# Patient Record
Sex: MALE | Race: WHITE | NOT HISPANIC OR LATINO | Employment: FULL TIME | URBAN - NONMETROPOLITAN AREA
[De-identification: names, ages, dates, MRNs, and addresses within clinical notes are randomized per-mention and may not be internally consistent; named-entity substitution may affect disease eponyms.]

---

## 2017-06-22 ENCOUNTER — OFFICE VISIT - GICH (OUTPATIENT)
Dept: FAMILY MEDICINE | Facility: OTHER | Age: 42
End: 2017-06-22

## 2017-06-22 ENCOUNTER — HISTORY (OUTPATIENT)
Dept: FAMILY MEDICINE | Facility: OTHER | Age: 42
End: 2017-06-22

## 2017-06-22 DIAGNOSIS — E04.2 NONTOXIC MULTINODULAR GOITER: ICD-10-CM

## 2017-06-22 DIAGNOSIS — E03.8 OTHER SPECIFIED HYPOTHYROIDISM: ICD-10-CM

## 2017-06-22 LAB — TSH - HISTORICAL: 1.59 UIU/ML (ref 0.34–5.6)

## 2017-07-10 ENCOUNTER — COMMUNICATION - GICH (OUTPATIENT)
Dept: FAMILY MEDICINE | Facility: OTHER | Age: 42
End: 2017-07-10

## 2017-12-28 NOTE — PROGRESS NOTES
"Patient Information     Patient Name MRN Sex Emil Palomino [id number] Male 1975      Progress Notes by Andrew Ross MD at 2017  2:00 PM     Author:  Andrew Ross MD Service:  (none) Author Type:  Physician     Filed:  2017  8:18 AM Encounter Date:  2017 Status:  Signed     :  Andrew Ross MD (Physician)            SUBJECTIVE:    Emil Logan is a 42 y.o. male who presents for med follow up     HPI    Has been on synthroid for over 5 years.  Doing well. No stool changes.  No palpitations.  Had a goiter which started it, medications resolved it.  Tried stopping the medications and it returned.     Allergies     Allergen  Reactions     Fish Containing Products Angioedema   ,   No current outpatient prescriptions on file prior to visit.     No current facility-administered medications on file prior to visit.    ,   Past Medical History:     Diagnosis  Date     Hypothyroidism     and   Past Surgical History:      Procedure  Laterality Date     NO PREVIOUS SURGERY         REVIEW OF SYSTEMS:  Review of Systems   Constitutional: Negative for weight loss.   Cardiovascular: Negative for palpitations.   Gastrointestinal: Negative for constipation and diarrhea.       OBJECTIVE:  /64  Resp 14  Ht 1.759 m (5' 9.25\")  Wt 92.3 kg (203 lb 6.4 oz)  BMI 29.82 kg/m2    EXAM:   Physical Exam   Constitutional: He is well-developed, well-nourished, and in no distress.   Neck: No thyromegaly present.   Cardiovascular: Normal rate and regular rhythm.  Exam reveals no gallop and no friction rub.    No murmur heard.      ASSESSMENT/PLAN:    ICD-10-CM    1. Other specified hypothyroidism E03.8 levothyroxine (SYNTHROID) 100 mcg tablet      TSH   2. Multinodular goiter (nontoxic) E04.2         Plan:  Goiter has been successfully shrunk with the synthroid. Follow up yearly.    Andrew Ross MD ....................  2017   2:08 PM    Results for orders placed or performed in " visit on 06/22/17      TSH      Result  Value Ref Range    TSH 1.59 0.34 - 5.60 uIU/mL

## 2017-12-28 NOTE — TELEPHONE ENCOUNTER
Patient Information     Patient Name MRN Sex Emil Palomino [id number] Male 1975      Telephone Encounter by Antione Cloud LPN at 2017  8:26 AM     Author:  Antione Cloud LPN Service:  (none) Author Type:  NURS- Licensed Practical Nurse     Filed:  2017  8:26 AM Encounter Date:  7/10/2017 Status:  Signed     :  Antione Cloud LPN (NURS- Licensed Practical Nurse)            Please advise.  Antione Cloud LPN ..............2017 8:26 AM'

## 2017-12-28 NOTE — TELEPHONE ENCOUNTER
Patient Information     Patient Name MRN Sex Emil Palomino [id number] Male 1975      Telephone Encounter by Andrew Ross MD at 2017  8:47 AM     Author:  Andrew Ross MD Service:  (none) Author Type:  Physician     Filed:  2017  8:47 AM Encounter Date:  7/10/2017 Status:  Signed     :  Andrew Ross MD (Physician)            This needs to go to a the MD who did the visit.  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  Andrew Ross MD ....................  2017   8:47 AM

## 2017-12-28 NOTE — TELEPHONE ENCOUNTER
"Patient Information     Patient Name MRN Sex Emil Palomino [id number] Male 1975      Telephone Encounter by Liliana Ruiz at 2017 12:27 PM     Author:  Liliana Ruiz Service:  (none) Author Type:  (none)     Filed:  2017 12:39 PM Encounter Date:  7/10/2017 Status:  Signed     :  Liliana Ruiz from [facility] called back regarding patient's DOT form. She states that on page 1; the \"drivers ID verified by\" line was left blank and she needs it filled out. On page 3; the line where it says \"the applicant can read signs that are red, green and herlinda colored\" was checked NO but the monocular vision was checked YES, yet the patient was given a medical card. She is confused and is wondering if those lines were checked wrong as her employer stated that anyone who is color blind cannot be given a medical card and cannot work for their company. She would like a call back regarding this and the form to be filled out correctly if there were any mistakes.   Liliana HUYNH CMA.......2017..12:37 PM          "

## 2017-12-30 NOTE — NURSING NOTE
Patient Information     Patient Name MRN Sex Emil Palomino [id number] Male 1975      Nursing Note by Esthela Flores at 2017  2:00 PM     Author:  Esthela Flores Service:  (none) Author Type:  (none)     Filed:  2017  1:59 PM Encounter Date:  2017 Status:  Signed     :  Esthela Flores            Coming in to have his medication refilled.Esthela Flores ....................  2017   1:53 PM

## 2018-01-27 VITALS
DIASTOLIC BLOOD PRESSURE: 64 MMHG | SYSTOLIC BLOOD PRESSURE: 124 MMHG | RESPIRATION RATE: 14 BRPM | WEIGHT: 203.4 LBS | HEIGHT: 69 IN | BODY MASS INDEX: 30.13 KG/M2

## 2018-02-16 ENCOUNTER — DOCUMENTATION ONLY (OUTPATIENT)
Dept: FAMILY MEDICINE | Facility: OTHER | Age: 43
End: 2018-02-16

## 2018-02-16 PROBLEM — E04.2 MULTINODULAR GOITER (NONTOXIC): Status: ACTIVE | Noted: 2017-06-22

## 2018-02-16 RX ORDER — LEVOTHYROXINE SODIUM 100 UG/1
100 TABLET ORAL DAILY
COMMUNITY
Start: 2017-06-22 | End: 2018-08-06

## 2018-03-07 ENCOUNTER — OFFICE VISIT (OUTPATIENT)
Dept: UROLOGY | Facility: OTHER | Age: 43
End: 2018-03-07
Attending: UROLOGY
Payer: COMMERCIAL

## 2018-03-07 VITALS
BODY MASS INDEX: 30.27 KG/M2 | WEIGHT: 204.4 LBS | SYSTOLIC BLOOD PRESSURE: 118 MMHG | HEIGHT: 69 IN | RESPIRATION RATE: 12 BRPM | HEART RATE: 64 BPM | DIASTOLIC BLOOD PRESSURE: 82 MMHG

## 2018-03-07 DIAGNOSIS — Z30.09 ENCOUNTER FOR VASECTOMY COUNSELING: Primary | ICD-10-CM

## 2018-03-07 PROCEDURE — 99203 OFFICE O/P NEW LOW 30 MIN: CPT | Performed by: UROLOGY

## 2018-03-07 RX ORDER — HYDROCODONE BITARTRATE AND ACETAMINOPHEN 5; 325 MG/1; MG/1
1-2 TABLET ORAL EVERY 4 HOURS PRN
Qty: 12 TABLET | Refills: 0 | Status: SHIPPED | OUTPATIENT
Start: 2018-03-07 | End: 2019-01-25

## 2018-03-07 ASSESSMENT — PAIN SCALES - GENERAL: PAINLEVEL: NO PAIN (0)

## 2018-03-07 NOTE — PROGRESS NOTES
I was asked to see this patient by Dr Ross and provide my opinion about the following:  Elective sterilization    Type of Visit  Consult    Chief Complaint  Elective sterilization    HPI  Mr. Logan is a 42 year old male who presents with desire for irreversible, elective sterilization.    He has 3 kids.    His significant other is supportive of this decision.    He has been considering this decision for 5 years.  He denies erectile dysfunction.  He denies a history of bleeding disorders or reactions to local anesthetic.      Past Medical History  He  has a past medical history of Hypothyroidism.  Patient Active Problem List   Diagnosis     Hypothyroidism     Multinodular goiter (nontoxic)       Past Surgical History  He  has a past surgical history that includes other surgical history.    Medications  He has a current medication list which includes the following prescription(s): hydrocodone-acetaminophen and levothyroxine.    Allergies  Allergies   Allergen Reactions     Fish-Derived Products Other (See Comments)     Angioedema, fresh water fish       Social History  He  reports that he quit smoking about 6 years ago. His smoking use included Cigarettes. He smoked 0.25 packs per day. His smokeless tobacco use includes Chew. He reports that he drinks alcohol.  No drug abuse.    Family History  No  cancers    Review of Systems  I personally reviewed the ROS with the patient.    Nursing Notes:   Yanna Crowley RN  3/7/2018  8:38 AM  Signed  Review of Systems:    Weight loss:    No     Recent fever/chills:  No   Night sweats:   No  Current skin rash:  No   Recent hair loss:  No  Heat intolerance:  No   Cold intolerance:  No  Chest pain:   No   Palpitations:   No  Shortness of breath:  No   Wheezing:   No  Constipation:    No   Diarrhea:   No   Nausea:   No   Vomiting:   No   Kidney/side pain:  No   Back pain:   No  Frequent headaches:  No   Dizziness:     No  Leg swelling:   No   Calf pain:    No    Parents,  "brothers or sisters with history of kidney cancer:   No  Parents, brothers or sisters with history of bladder cancer: No    Physical Exam  Vitals:    03/07/18 0834   BP: 118/82   BP Location: Right arm   Patient Position: Sitting   Cuff Size: Adult Regular   Pulse: 64   Resp: 12   Weight: 92.7 kg (204 lb 6.4 oz)   Height: 1.753 m (5' 9\")     Constitutional: NAD, WDWN.   Head: NCAT  Eyes: Conjunctivae normal  Cardiovascular: Regular rate.  Pulmonary/Chest: Respirations are even and non-labored bilaterally.  Abdominal: Soft. No distension, tenderness, masses or guarding. No CVA tenderness.  Neurological: A + O x 3.  Cranial Nerves II-XII grossly intact.  Extremities: LOULOU x 4, Warm. No clubbing.  No cyanosis.    Skin: Pink, warm and dry.  No rashes noted.  Psychiatric:  Normal mood and affect  Genitourinary:  Phallus normal without lesions, testicles descended bilaterally, no masses.    Bilateral vasa palpable    Assessment  Mr. Logan is a 42 year old male who presents today requesting permanent, irreversible surgical sterilization.  The vasectomy procedure was discussed in detail with the patient today including the following:  - Preparation prior to the procedure  - Expectations the day of the procedure  - Risks of the procedure such as sterilization failure, infection, bleeding and/or development of chronic testicular pain.    - Expectations and limitations during recovery    Furthermore, the patient was told he would remain fertile following the procedure until he provided a semen analysis that met the definition of infertile (no sperm present or <100,000 nonmotile sperm/mL).  This is usually planned for 3 months after the procedure.  The patient expressed understanding and desire to proceed.    Plan  Rx for Norco prior to and after the procedure  Provided vasectomy hand out again outlining the above risks and benefits as well as need for follow up semen analysis    "

## 2018-03-07 NOTE — MR AVS SNAPSHOT
"              After Visit Summary   3/7/2018    Emil Logan    MRN: [id number]           Patient Information     Date Of Birth          1975        Visit Information        Provider Department      3/7/2018 8:30 AM Paulo Lou MD [facility]        Today's Diagnoses     Encounter for vasectomy counseling    -  1       Follow-ups after your visit        Who to contact     If you have questions or need follow up information about today's clinic visit or your schedule please contact [facility] directly at [phone].  Normal or non-critical lab and imaging results will be communicated to you by E-nterviewhart, letter or phone within 4 business days after the clinic has received the results. If you do not hear from us within 7 days, please contact the clinic through WeoGeot or phone. If you have a critical or abnormal lab result, we will notify you by phone as soon as possible.  Submit refill requests through Wedia or call your pharmacy and they will forward the refill request to us. Please allow 3 business days for your refill to be completed.          Additional Information About Your Visit        MyChart Information     Wedia lets you send messages to your doctor, view your test results, renew your prescriptions, schedule appointments and more. To sign up, go to [email or web address] . Click on \"Log in\" on the left side of the screen, which will take you to the Welcome page. Then click on \"Sign up Now\" on the right side of the page.     You will be asked to enter the access code listed below, as well as some personal information. Please follow the directions to create your username and password.     Your access code is: [email or web address]  Expires: 2018  8:56 AM     Your access code will  in 90 days. If you need help or a new code, please call your [facility] or [phone].        Care EveryWhere ID     This is your Care EveryWhere ID. This could be " "used by other organizations to access your [place] medical records  [id number]        Your Vitals Were     Pulse Respirations Height BMI (Body Mass Index)          64 12 1.753 m (5' 9\") 30.18 kg/m2         Blood Pressure from Last 3 Encounters:   03/07/18 118/82   06/22/17 124/64   05/06/16 128/70    Weight from Last 3 Encounters:   03/07/18 92.7 kg (204 lb 6.4 oz)   06/22/17 92.3 kg (203 lb 6.4 oz)   05/06/16 92 kg (202 lb 12.8 oz)              Today, you had the following     No orders found for display         Today's Medication Changes          These changes are accurate as of 3/7/18  8:57 AM.  If you have any questions, ask your nurse or doctor.               Start taking these medicines.        Dose/Directions    HYDROcodone-acetaminophen 5-325 MG per tablet   Commonly known as:  NORCO   Used for:  Encounter for vasectomy counseling   Started by:  Paulo Lou MD        Dose:  1-2 tablet   Take 1-2 tablets by mouth every 4 hours as needed Take 1-2 tablets by mouth every 4-6 hours as needed for pain   Quantity:  12 tablet   Refills:  0            Where to get your medicines      Some of these will need a paper prescription and others can be bought over the counter.  Ask your nurse if you have questions.     Bring a paper prescription for each of these medications     HYDROcodone-acetaminophen 5-325 MG per tablet                Primary Care Provider Office Phone # Fax #    Andrew Ross [phone]       [address] [facility] [address]        Equal Access to Services     [facility]: Hadii delores echavarriao Sodanny, waaxda luqadaha, qaybta kaalmada adeegkai, waxay idibrandi khan. So [facility] [phone].    ATENCIÓN: Si habla español, tiene a shearer disposición servicios gratuitos de asistencia lingüística. Llame al [phone].    We comply with applicable federal civil rights laws and [place] laws. We do not discriminate on the basis of race, color, national " origin, age, disability, sex, sexual orientation, or gender identity.            Thank you!     Thank you for choosing [facility] AND [facility]  for your care. Our goal is always to provide you with excellent care. Hearing back from our patients is one way we can continue to improve our services. Please take a few minutes to complete the written survey that you may receive in the mail after your visit with us. Thank you!             Your Updated Medication List - Protect others around you: Learn how to safely use, store and throw away your medicines at www.disposemymeds.org.          This list is accurate as of 3/7/18  8:57 AM.  Always use your most recent med list.                   Brand Name Dispense Instructions for use Diagnosis    HYDROcodone-acetaminophen 5-325 MG per tablet    NORCO    12 tablet    Take 1-2 tablets by mouth every 4 hours as needed Take 1-2 tablets by mouth every 4-6 hours as needed for pain    Encounter for vasectomy counseling       levothyroxine 100 MCG tablet    SYNTHROID/LEVOTHROID     Take 100 mcg by mouth daily

## 2018-03-07 NOTE — NURSING NOTE
Review of Systems:    Weight loss:    No     Recent fever/chills:  No   Night sweats:   No  Current skin rash:  No   Recent hair loss:  No  Heat intolerance:  No   Cold intolerance:  No  Chest pain:   No   Palpitations:   No  Shortness of breath:  No   Wheezing:   No  Constipation:    No   Diarrhea:   No   Nausea:   No   Vomiting:   No   Kidney/side pain:  No   Back pain:   No  Frequent headaches:  No   Dizziness:     No  Leg swelling:   No   Calf pain:    No    Parents, brothers or sisters with history of kidney cancer:   No  Parents, brothers or sisters with history of bladder cancer: No

## 2018-05-30 ENCOUNTER — OFFICE VISIT (OUTPATIENT)
Dept: UROLOGY | Facility: OTHER | Age: 43
End: 2018-05-30
Attending: UROLOGY
Payer: COMMERCIAL

## 2018-05-30 VITALS — HEART RATE: 62 BPM | BODY MASS INDEX: 30.27 KG/M2 | RESPIRATION RATE: 14 BRPM | WEIGHT: 205 LBS

## 2018-05-30 DIAGNOSIS — Z30.2 ENCOUNTER FOR VASECTOMY: Primary | ICD-10-CM

## 2018-05-30 PROCEDURE — 88302 TISSUE EXAM BY PATHOLOGIST: CPT

## 2018-05-30 PROCEDURE — 55250 REMOVAL OF SPERM DUCT(S): CPT | Performed by: UROLOGY

## 2018-05-30 ASSESSMENT — PAIN SCALES - GENERAL: PAINLEVEL: NO PAIN (0)

## 2018-05-30 NOTE — NURSING NOTE
Vasectomy  Per verbal order read back by Paulo Lou MD to prep patient for vasectomy.  Patient positioned in supine position, perineum area prepped with chlorhexidene Gluconate and patient draped per sterile technique.    Lidocaine 2%  Lot #: [id number]  Expiration date: 01/22  : SELENA [facility]: [id number]    [place] Protocol    A. Pre-procedure verification complete Yes  1-relevant information / documentation available, reviewed and properly matched to the patient; 2-consent accurate and complete, 3-equipment and supplies available    B. Site marking complete N/A  Site marked if not in continuous attendance with patient    C. TIME OUT completed Yes  Time Out was conducted just prior to starting procedure to verify the eight required elements: 1-patient identity, 2-consent accurate and complete, 3-position, 4-correct side/site marked (if applicable), 5-procedure, 6-relevant images / results properly labeled and displayed (if applicable), 7-antibiotics / irrigation fluids (if applicable), 8-safety precautions.    After procedure perineum area rinsed. Semen analysis container given to patient. Patient reminded to have a semen analysis performed 3 months after the procedure to confirm sterility and to ejaculate about 1-2 dozen times following the vasectomy and prior to semen collection. Discharge instructions reviewed with patient. Patient verbalized understanding of discharge instructions and discharged ambulatory.

## 2018-05-30 NOTE — PATIENT INSTRUCTIONS
Home Care after Vasectomy   Follow these guidelines for your care after your surgery to help your recovery.     Activity   Limit your activity for the first 5 days after the procedure to light activity.   You may return to work typically in 2 days.   Limit lifting, pushing or pulling to less than 20 pounds until you are no longer sore.   Limit running and long walks until you are no longer sore.   Limit sexual activity for 5 days.     Swelling   Scrotal swelling from the surgery may take weeks to get better. You should call your doctor if the swelling is severe and the scrotum is larger than an orange.  Apply a bag of frozen peas to the scrotum for 15 minutes every 1-2 hours for the first 48 hours when you are awake to limit swelling. It works best to not apply the bag of frozen peas directly to the skin.  Wear a jock strap to support your scrotum and reduce swelling.  Continue wearing the jock strap until you are no longer sore, 1-2 weeks.     Incision care   The single stitch placed in the scrotum will dissolve and does not need to be removed.  A small amount of blood may stain the dressings for up to 2-3 days after the procedure.  For the first few days, apply two or three gauze pads to the site each day and as needed to keep the dressing dry. This will protect the incision and help keep your clothes clean.  When you are no longer having any drainage, stop using the gauze pads over the site.     Bathing or showering   You may shower after the procedure but do not scrub the stitch area.  Allow the water to wash over the stitch and do not scrub the area. Dry the site gently by patting it with a clean towel.  Tub baths should be avoided for 7 days after surgery.  Swimming should be avoided for 14 days after surgery.        Pain control   You were provided with a pain medication prescription during the clinic consultation, please use this as needed.  Also, please take ibuprofen as we discussed in clinic.  Pain most  often is eased after 5 to 7 days.     Sexual activity   Please avoid ejaculating for 5 days after procedure or until soreness is resolved.     Follow up   Following this procedure you are still considered fertile and would be able to impregnate your partner.  You should have a semen analysis performed 3 months or greater after your procedure to confirm sterility.  Ideally you would ejaculate about 2-3 dozen times following the vasectomy and prior to semen collection.  Use birth control until the semen analysis is confirmed sterile.     Use contraceptives until this is confirmed to prevent pregnancy.     Contacts   General Questions: [phone]   Appointments: [phone]   Emergencies: [phone]     When to call your doctor   Call the urology office if you have any of the following:   Severe swelling, larger than the size of an orange   A large amount of fluid drainage that soaks several pads per day   Pain that is not controlled with pain medicine, jock strap and use of frozen peas   Any signs of infection such as the following:   -Redness or tenderness around the incision site worsening after 2-3 days or   -Pus type drainage from the incision or   -Fever of greater than 101 degrees F     Also call the office if you have any questions or concerns about your care.

## 2018-05-30 NOTE — MR AVS SNAPSHOT
After Visit Summary   5/30/2018    Emil Logan    MRN: [id number]           Patient Information     Date Of Birth          1975        Visit Information        Provider Department      5/30/2018 9:15 AM Paulo Lou MD [facility]        Today's Diagnoses     Encounter for vasectomy    -  1      Care Instructions    Home Care after Vasectomy   Follow these guidelines for your care after your surgery to help your recovery.     Activity   Limit your activity for the first 5 days after the procedure to light activity.   You may return to work typically in 2 days.   Limit lifting, pushing or pulling to less than 20 pounds until you are no longer sore.   Limit running and long walks until you are no longer sore.   Limit sexual activity for 5 days.     Swelling   Scrotal swelling from the surgery may take weeks to get better. You should call your doctor if the swelling is severe and the scrotum is larger than an orange.  Apply a bag of frozen peas to the scrotum for 15 minutes every 1-2 hours for the first 48 hours when you are awake to limit swelling. It works best to not apply the bag of frozen peas directly to the skin.  Wear a jock strap to support your scrotum and reduce swelling.  Continue wearing the jock strap until you are no longer sore, 1-2 weeks.     Incision care   The single stitch placed in the scrotum will dissolve and does not need to be removed.  A small amount of blood may stain the dressings for up to 2-3 days after the procedure.  For the first few days, apply two or three gauze pads to the site each day and as needed to keep the dressing dry. This will protect the incision and help keep your clothes clean.  When you are no longer having any drainage, stop using the gauze pads over the site.     Bathing or showering   You may shower after the procedure but do not scrub the stitch area.  Allow the water to wash over the stitch and do not scrub the area. Dry  the site gently by patting it with a clean towel.  Tub baths should be avoided for 7 days after surgery.  Swimming should be avoided for 14 days after surgery.        Pain control   You were provided with a pain medication prescription during the clinic consultation, please use this as needed.  Also, please take ibuprofen as we discussed in clinic.  Pain most often is eased after 5 to 7 days.     Sexual activity   Please avoid ejaculating for 5 days after procedure or until soreness is resolved.     Follow up   Following this procedure you are still considered fertile and would be able to impregnate your partner.  You should have a semen analysis performed 3 months or greater after your procedure to confirm sterility.  Ideally you would ejaculate about 2-3 dozen times following the vasectomy and prior to semen collection.  Use birth control until the semen analysis is confirmed sterile.     Use contraceptives until this is confirmed to prevent pregnancy.     Contacts   General Questions: [phone]   Appointments: [phone]   Emergencies: [phone]     When to call your doctor   Call the urology office if you have any of the following:   Severe swelling, larger than the size of an orange   A large amount of fluid drainage that soaks several pads per day   Pain that is not controlled with pain medicine, jock strap and use of frozen peas   Any signs of infection such as the following:   -Redness or tenderness around the incision site worsening after 2-3 days or   -Pus type drainage from the incision or   -Fever of greater than 101 degrees F     Also call the office if you have any questions or concerns about your care.           Follow-ups after your visit        Future tests that were ordered for you today     Open Future Orders        Priority Expected Expires Ordered    Post Vasectomy Count Motility Routine  5/29/2019 5/30/2018            Who to contact     If you have questions or need follow up information  "about today's clinic visit or your schedule please contact [facility] directly at [phone].  Normal or non-critical lab and imaging results will be communicated to you by GBShart, letter or phone within 4 business days after the clinic has received the results. If you do not hear from us within 7 days, please contact the clinic through GBShart or phone. If you have a critical or abnormal lab result, we will notify you by phone as soon as possible.  Submit refill requests through Torbit or call your pharmacy and they will forward the refill request to us. Please allow 3 business days for your refill to be completed.          Additional Information About Your Visit        GBShart Information     Torbit lets you send messages to your doctor, view your test results, renew your prescriptions, schedule appointments and more. To sign up, go to [email or web address] . Click on \"Log in\" on the left side of the screen, which will take you to the Welcome page. Then click on \"Sign up Now\" on the right side of the page.     You will be asked to enter the access code listed below, as well as some personal information. Please follow the directions to create your username and password.     Your access code is: [email or web address]  Expires: 2018  9:56 AM     Your access code will  in 90 days. If you need help or a new code, please call your [place] clinic or [phone].        Care EveryWhere ID     This is your Care EveryWhere ID. This could be used by other organizations to access your [facility] medical records  [id number]        Your Vitals Were     Pulse Respirations BMI (Body Mass Index)             62 14 30.27 kg/m2          Blood Pressure from Last 3 Encounters:   18 118/82   17 124/64   16 128/70    Weight from Last 3 Encounters:   18 93 kg (205 lb)   18 92.7 kg (204 lb 6.4 oz)   17 92.3 kg (203 lb 6.4 oz)              We Performed the Following     " Surgical pathology exam     VASECTOMY UNILAT/BILAT W POSTOP SEMEN        Primary Care Provider Office Phone # Fax #    Andrew Ross [phone] [address] [facility] [address]        Equal Access to Services     COLEENADEOLA ALEX : Hadii delores grayson hadtyroneo Soomaali, waaxda luqadaha, qaybta kaalmada adeegyada, waxbrittney bradly dorisn cyndie fatouparul khan. So [facility] [phone].    ATENCIÓN: Si habla español, tiene a shearer disposición servicios gratuitos de asistencia lingüística. Llame al [phone].    We comply with applicable federal civil rights laws and [place] laws. We do not discriminate on the basis of race, color, national origin, age, disability, sex, sexual orientation, or gender identity.            Thank you!     Thank you for choosing [facility]  for your care. Our goal is always to provide you with excellent care. Hearing back from our patients is one way we can continue to improve our services. Please take a few minutes to complete the written survey that you may receive in the mail after your visit with us. Thank you!             Your Updated Medication List - Protect others around you: Learn how to safely use, store and throw away your medicines at www.disposemymeds.org.          This list is accurate as of 5/30/18  9:22 AM.  Always use your most recent med list.                   Brand Name Dispense Instructions for use Diagnosis    HYDROcodone-acetaminophen 5-325 MG per tablet    NORCO    12 tablet    Take 1-2 tablets by mouth every 4 hours as needed Take 1-2 tablets by mouth every 4-6 hours as needed for pain    Encounter for vasectomy counseling       levothyroxine 100 MCG tablet    SYNTHROID/LEVOTHROID     Take 100 mcg by mouth daily

## 2018-05-30 NOTE — PROGRESS NOTES
Preoperative diagnosis  Elective sterilization    Postoperative diagnosis  Elective sterilization    Procedure performed  Bilateral vasectomy    Surgeon  Paulo Lou MD    Anesthesia  8 mL lidocaine 2% local injection    Complications  None    EBL  <1 mL    Specimen  Left vas  Right vas    Indications  43 year old male agreed to undergo the above named procedure after discussion of the alternatives, risks and benefits.  Informed consent was obtained.      Procedure  The patient was brought to the procedure room and placed in supine position.  His scrotum was prepped with Hibiclens and he was draped in a sterile fashion.  A timeout was performed.    Lidocaine 2% was used to anesthetize the scrotal skin as well as perivasal sheath initially on the patient's left.  A 1 cm skin incision was created with the sharp-tip mosquito and a vas clamp utilized through this incision to grasp the vas.  The left vas was elevated to the skin surface and dissected free of its perivasal sheath.  The vas was transected and the lumen was cauterized both proximally and distally.  A 4-0 chromic suture was utilized to place the proximal vasal portion under the perivasal sheath, such that the 2 ends were divided by the perivasal sheath (fascial interposition).  This portion of the vas was then allowed to drop back into the left hemiscrotum.  The right side was treated next in the same manner as described above.  The skin incision was closed with the 4-0 chromic suture.  The patient tolerated the procedure well.    It was again reiterated to the patient that he would remain fertile for sometime and should present to the office for a post-vacectomy semen analysis to confirm sterility in 3 months.  The patient again expressed understanding.

## 2018-07-23 NOTE — PROGRESS NOTES
Patient Information     Patient Name  Emil oLgan MRN  [id number] Sex  Male   1975      Letter by Andrew Ross MD at      Author:  Andrew Ross MD Service:  (none) Author Type:  (none)    Filed:   Encounter Date:  2017 Status:  (Other)           Emil Logan  [address]  [place] [address]          2017    Dear Mr. Logan:    Following are the tests completed during your last clinic visit.  The results of these tests are normal and require no further attention unless otherwise noted.    Results for orders placed or performed in visit on 17      TSH      Result  Value Ref Range    TSH 1.59 0.34 - 5.60 uIU/mL         If you have any further questions or problems contact my office at  [phone].    Thank you,    Andrew Ross MD

## 2018-08-06 DIAGNOSIS — E03.8 OTHER SPECIFIED HYPOTHYROIDISM: Primary | ICD-10-CM

## 2018-08-06 NOTE — LETTER
August 9, 2018      Emil Logan  [address] [address]        Dear Emil,       This is to remind you that you are due for your annual appointment with Andrew Ross as well as annual labs. Your last visit was on 6/22/17. Additional refills of your medication require you to complete this visit.    Please call [phone] to schedule your appointment.    Thank you for choosing [facility] and [facility] for your health care needs.    Sincerely,      Refill RN  [facility]

## 2018-08-09 RX ORDER — LEVOTHYROXINE SODIUM 100 UG/1
TABLET ORAL
Qty: 90 TABLET | Refills: 0 | Status: SHIPPED | OUTPATIENT
Start: 2018-08-09 | End: 2019-01-08

## 2018-08-09 NOTE — TELEPHONE ENCOUNTER
Last TSH was on 6/22/17 with result of 1.59. LOV with PCP was also on that date. No appointment is scheduled at this time. No changes noted to Rx as requested in office visit notes on 6/22/17. However, patient was to follow up on a yearly basis and has failed to do so. Writer will refill Rx as requested for a limited supply at this time as well as will send patient a reminder letter.    Prescription refilled per RN Medication Refill Policy..................Joseph Ratliff 8/9/2018 11:27 AM

## 2019-01-08 ENCOUNTER — TELEPHONE (OUTPATIENT)
Dept: FAMILY MEDICINE | Facility: OTHER | Age: 44
End: 2019-01-08

## 2019-01-08 DIAGNOSIS — E03.8 OTHER SPECIFIED HYPOTHYROIDISM: ICD-10-CM

## 2019-01-08 RX ORDER — LEVOTHYROXINE SODIUM 100 UG/1
100 TABLET ORAL DAILY
Qty: 30 TABLET | Refills: 0 | Status: SHIPPED | OUTPATIENT
Start: 2019-01-08 | End: 2019-01-25

## 2019-01-08 NOTE — TELEPHONE ENCOUNTER
Refill request from patient for  Levothyroxine (Synthroid/Levthroid) 100 mcg tablet    LOV with PCP 6/22/2017    Pt given manuel refill 8/6/2018 and letter sent.    Verified medication and pharmacy with pt    Contacted pt and assisted in scheduling appt for 1/25/2019 with PCP    Will fill for 30 days    Fern Ramirez RN  ....................  1/8/2019   2:17 PM

## 2019-01-25 ENCOUNTER — OFFICE VISIT (OUTPATIENT)
Dept: FAMILY MEDICINE | Facility: OTHER | Age: 44
End: 2019-01-25
Attending: FAMILY MEDICINE
Payer: COMMERCIAL

## 2019-01-25 VITALS
RESPIRATION RATE: 16 BRPM | WEIGHT: 206 LBS | BODY MASS INDEX: 29.49 KG/M2 | SYSTOLIC BLOOD PRESSURE: 120 MMHG | HEIGHT: 70 IN | DIASTOLIC BLOOD PRESSURE: 64 MMHG | HEART RATE: 64 BPM

## 2019-01-25 DIAGNOSIS — E03.8 OTHER SPECIFIED HYPOTHYROIDISM: ICD-10-CM

## 2019-01-25 DIAGNOSIS — Z00.00 ROUTINE GENERAL MEDICAL EXAMINATION AT A HEALTH CARE FACILITY: Primary | ICD-10-CM

## 2019-01-25 LAB
ANION GAP SERPL CALCULATED.3IONS-SCNC: 7 MMOL/L (ref 3–14)
BUN SERPL-MCNC: 17 MG/DL (ref 7–25)
CALCIUM SERPL-MCNC: 9.5 MG/DL (ref 8.6–10.3)
CHLORIDE SERPL-SCNC: 105 MMOL/L (ref 98–107)
CHOLEST SERPL-MCNC: 201 MG/DL
CO2 SERPL-SCNC: 28 MMOL/L (ref 21–31)
CREAT SERPL-MCNC: 0.98 MG/DL (ref 0.7–1.3)
GFR SERPL CREATININE-BSD FRML MDRD: 83 ML/MIN/{1.73_M2}
GLUCOSE SERPL-MCNC: 84 MG/DL (ref 70–105)
HDLC SERPL-MCNC: 83 MG/DL (ref 23–92)
LDLC SERPL CALC-MCNC: 103 MG/DL
NONHDLC SERPL-MCNC: 118 MG/DL
POTASSIUM SERPL-SCNC: 4.4 MMOL/L (ref 3.5–5.1)
SODIUM SERPL-SCNC: 140 MMOL/L (ref 134–144)
TRIGL SERPL-MCNC: 77 MG/DL
TSH SERPL DL<=0.05 MIU/L-ACNC: 0.38 IU/ML (ref 0.34–5.6)

## 2019-01-25 PROCEDURE — 80048 BASIC METABOLIC PNL TOTAL CA: CPT | Performed by: FAMILY MEDICINE

## 2019-01-25 PROCEDURE — 36415 COLL VENOUS BLD VENIPUNCTURE: CPT | Performed by: FAMILY MEDICINE

## 2019-01-25 PROCEDURE — 99396 PREV VISIT EST AGE 40-64: CPT | Performed by: FAMILY MEDICINE

## 2019-01-25 PROCEDURE — 84443 ASSAY THYROID STIM HORMONE: CPT | Performed by: FAMILY MEDICINE

## 2019-01-25 PROCEDURE — 80061 LIPID PANEL: CPT | Performed by: FAMILY MEDICINE

## 2019-01-25 RX ORDER — LEVOTHYROXINE SODIUM 100 UG/1
100 TABLET ORAL DAILY
Qty: 90 TABLET | Refills: 3 | Status: SHIPPED | OUTPATIENT
Start: 2019-01-25 | End: 2020-02-19

## 2019-01-25 ASSESSMENT — MIFFLIN-ST. JEOR: SCORE: 1817.72

## 2019-01-25 ASSESSMENT — ANXIETY QUESTIONNAIRES
GAD7 TOTAL SCORE: 0
6. BECOMING EASILY ANNOYED OR IRRITABLE: NOT AT ALL
3. WORRYING TOO MUCH ABOUT DIFFERENT THINGS: NOT AT ALL
5. BEING SO RESTLESS THAT IT IS HARD TO SIT STILL: NOT AT ALL
2. NOT BEING ABLE TO STOP OR CONTROL WORRYING: NOT AT ALL
7. FEELING AFRAID AS IF SOMETHING AWFUL MIGHT HAPPEN: NOT AT ALL
1. FEELING NERVOUS, ANXIOUS, OR ON EDGE: NOT AT ALL
IF YOU CHECKED OFF ANY PROBLEMS ON THIS QUESTIONNAIRE, HOW DIFFICULT HAVE THESE PROBLEMS MADE IT FOR YOU TO DO YOUR WORK, TAKE CARE OF THINGS AT HOME, OR GET ALONG WITH OTHER PEOPLE: NOT DIFFICULT AT ALL

## 2019-01-25 ASSESSMENT — PATIENT HEALTH QUESTIONNAIRE - PHQ9: 5. POOR APPETITE OR OVEREATING: NOT AT ALL

## 2019-01-25 ASSESSMENT — PAIN SCALES - GENERAL: PAINLEVEL: NO PAIN (0)

## 2019-01-25 NOTE — PROGRESS NOTES
SUBJECTIVE:   CC: Emil Logan is an 43 year old male who presents for preventive health visit.     Healthy Habits:    Do you get at least three servings of calcium containing foods daily (dairy, green leafy vegetables, etc.)? yes    Amount of exercise or daily activities, outside of work: 1-2 day(s) per week    Problems taking medications regularly No    Medication side effects: No    Have you had an eye exam in the past two years? yes    Do you see a dentist twice per year? no    Do you have sleep apnea, excessive snoring or daytime drowsiness?no      PROBLEMS TO ADD ON...needs a refill on his thyroid meds.    Today's PHQ-2 Score:   PHQ-2 (  [employer]) 2019   Q1: Little interest or pleasure in doing things 0   Q2: Feeling down, depressed or hopeless 0   PHQ-2 Score 0       Abuse: Current or Past(Physical, Sexual or Emotional)- No  Do you feel safe in your environment? Yes    Social History     Tobacco Use     Smoking status: Former Smoker     Packs/day: 0.25     Types: Cigarettes     Last attempt to quit:      Years since quittin.0     Smokeless tobacco: Former User     Types: Chew   Substance Use Topics     Alcohol use: Yes     Alcohol/week: 0.0 oz     Comment: weekly     If you drink alcohol do you typically have >3 drinks per day or >7 drinks per week? No                      Last PSA: No results found for: PSA    Reviewed orders with patient. Reviewed health maintenance and updated orders accordingly - Yes  Current Outpatient Medications   Medication Sig Dispense Refill     levothyroxine (SYNTHROID/LEVOTHROID) 100 MCG tablet Take 1 tablet (100 mcg) by mouth daily 90 tablet 3     Allergies   Allergen Reactions     Fish-Derived Products Other (See Comments)     Angioedema, fresh water fish       Reviewed and updated as needed this visit by clinical staff  Tobacco  Allergies  Meds  Med Hx  Surg Hx  Fam Hx  Soc Hx        Reviewed and updated as needed this visit by Provider        Past  "Medical History:   Diagnosis Date     Hypothyroidism     No Comments Provided      Past Surgical History:   Procedure Laterality Date     OTHER SURGICAL HISTORY      CSL196,NO PREVIOUS SURGERY       ROS:  CONSTITUTIONAL: NEGATIVE for fever, chills, change in weight  INTEGUMENTARY/SKIN: NEGATIVE for worrisome rashes, moles or lesions  EYES: NEGATIVE for vision changes or irritation  ENT: NEGATIVE for ear, mouth and throat problems  RESP: NEGATIVE for significant cough or SOB  CV: NEGATIVE for chest pain, palpitations or peripheral edema  GI: NEGATIVE for nausea, abdominal pain, heartburn, or change in bowel habits   male: negative for dysuria, hematuria, decreased urinary stream, erectile dysfunction, urethral discharge  MUSCULOSKELETAL: NEGATIVE for significant arthralgias or myalgia  NEURO: NEGATIVE for weakness, dizziness or paresthesias  PSYCHIATRIC: NEGATIVE for changes in mood or affect    OBJECTIVE:   /64 (BP Location: Right arm, Patient Position: Sitting, Cuff Size: Adult Large)   Pulse 64   Resp 16   Ht 1.765 m (5' 9.5\")   Wt 93.4 kg (206 lb)   BMI 29.98 kg/m    EXAM:  GENERAL: healthy, alert and no distress  EYES: Eyes grossly normal to inspection, PERRL and conjunctivae and sclerae normal  HENT: ear canals and TM's normal, nose and mouth without ulcers or lesions  NECK: no adenopathy, no asymmetry, masses, or scars and thyroid normal to palpation  RESP: lungs clear to auscultation - no rales, rhonchi or wheezes  CV: regular rate and rhythm, normal S1 S2, no S3 or S4, no murmur, click or rub, no peripheral edema and peripheral pulses strong  ABDOMEN: soft, nontender, no hepatosplenomegaly, no masses and bowel sounds normal  MS: no gross musculoskeletal defects noted, no edema  SKIN: no suspicious lesions or rashes  NEURO: Normal strength and tone, mentation intact and speech normal  PSYCH: mentation appears normal, affect normal/bright    Diagnostic Test Results:  Results for orders placed or " "performed in visit on 01/25/19   Basic Metabolic Panel   Result Value Ref Range    Sodium 140 134 - 144 mmol/L    Potassium 4.4 3.5 - 5.1 mmol/L    Chloride 105 98 - 107 mmol/L    Carbon Dioxide 28 21 - 31 mmol/L    Anion Gap 7 3 - 14 mmol/L    Glucose 84 70 - 105 mg/dL    Urea Nitrogen 17 7 - 25 mg/dL    Creatinine 0.98 0.70 - 1.30 mg/dL    GFR Estimate 83 >60 mL/min/[1.73_m2]    GFR Estimate If Black >90 >60 mL/min/[1.73_m2]    Calcium 9.5 8.6 - 10.3 mg/dL   TSH   Result Value Ref Range    Thyrotropin 0.38 0.34 - 5.60 IU/mL   Lipid Panel   Result Value Ref Range    Cholesterol 201 (H) <200 mg/dL    Triglycerides 77 <150 mg/dL    HDL Cholesterol 83 23 - 92 mg/dL    LDL Cholesterol Calculated 103 (H) <100 mg/dL    Non HDL Cholesterol 118 <130 mg/dL       ASSESSMENT/PLAN:   (Z00.00) Routine general medical examination at a health care facility  (primary encounter diagnosis)  Comment: healthy  Plan:      (E03.8) Other specified hypothyroidism  Comment: stable  Plan: levothyroxine (SYNTHROID/LEVOTHROID) 100 MCG         tablet, Lipid Panel, TSH, Basic Metabolic Panel        Refilled meds      COUNSELING:  Reviewed preventive health counseling, as reflected in patient instructions       Regular exercise       Healthy diet/nutrition    BP Readings from Last 1 Encounters:   01/25/19 120/64     Estimated body mass index is 29.98 kg/m  as calculated from the following:    Height as of this encounter: 1.765 m (5' 9.5\").    Weight as of this encounter: 93.4 kg (206 lb).      Weight management plan: Discussed healthy diet and exercise guidelines     reports that he quit smoking about 7 years ago. His smoking use included cigarettes. He smoked 0.25 packs per day. He has quit using smokeless tobacco. His smokeless tobacco use included chew.  Tobacco Cessation Action Plan: Information offered: Patient not interested at this time    Counseling Resources:  ATP IV Guidelines  Pooled Cohorts Equation Calculator  FRAX Risk " Assessment  ICSI Preventive Guidelines  Dietary Guidelines for Americans, 2010  USDA's MyPlate  ASA Prophylaxis  Lung CA Screening    Andrew Ross MD  [facility]

## 2019-01-25 NOTE — LETTER
January 28, 2019      Emil Logan  [address] [address]   [place] [address]        Dear Emil,     This all looks great.  Your 10 year risk of a heart attack is under 1%.  This is low due to the very high protective HDL reading, which makes the total look elevated.    Results for orders placed or performed in visit on 01/25/19   Basic Metabolic Panel   Result Value Ref Range    Sodium 140 134 - 144 mmol/L    Potassium 4.4 3.5 - 5.1 mmol/L    Chloride 105 98 - 107 mmol/L    Carbon Dioxide 28 21 - 31 mmol/L    Anion Gap 7 3 - 14 mmol/L    Glucose 84 70 - 105 mg/dL    Urea Nitrogen 17 7 - 25 mg/dL    Creatinine 0.98 0.70 - 1.30 mg/dL    GFR Estimate 83 >60 mL/min/[1.73_m2]    GFR Estimate If Black >90 >60 mL/min/[1.73_m2]    Calcium 9.5 8.6 - 10.3 mg/dL   TSH   Result Value Ref Range    Thyrotropin 0.38 0.34 - 5.60 IU/mL   Lipid Panel   Result Value Ref Range    Cholesterol 201 (H) <200 mg/dL    Triglycerides 77 <150 mg/dL    HDL Cholesterol 83 23 - 92 mg/dL    LDL Cholesterol Calculated 103 (H) <100 mg/dL    Non HDL Cholesterol 118 <130 mg/dL             Sincerely,        Andrew Ross MD

## 2019-01-25 NOTE — NURSING NOTE
"Coming in for a medication up date    Chief Complaint   Patient presents with     Recheck Medication     needs refill        Initial /64 (BP Location: Right arm, Patient Position: Sitting, Cuff Size: Adult Large)   Pulse 64   Resp 16   Ht 1.765 m (5' 9.5\")   Wt 93.4 kg (206 lb)   BMI 29.98 kg/m   Estimated body mass index is 29.98 kg/m  as calculated from the following:    Height as of this encounter: 1.765 m (5' 9.5\").    Weight as of this encounter: 93.4 kg (206 lb).  Medication Reconciliation: complete    Esthela Flores LPN  "

## 2019-01-26 ASSESSMENT — ANXIETY QUESTIONNAIRES: GAD7 TOTAL SCORE: 0

## 2019-06-10 ENCOUNTER — APPOINTMENT (OUTPATIENT)
Dept: FAMILY MEDICINE | Facility: OTHER | Age: 44
End: 2019-06-10
Attending: CHIROPRACTOR

## 2019-06-10 PROCEDURE — 40001068 ZZHCL STATISTIC UA W/ REFLEX TO MICRO GICH: Mod: ZL | Performed by: CHIROPRACTOR

## 2019-06-10 PROCEDURE — 99499 UNLISTED E&M SERVICE: CPT | Performed by: CHIROPRACTOR

## 2019-11-08 DIAGNOSIS — E03.8 OTHER SPECIFIED HYPOTHYROIDISM: ICD-10-CM

## 2019-11-11 RX ORDER — LEVOTHYROXINE SODIUM 100 UG/1
TABLET ORAL
Qty: 90 TABLET | Refills: 0 | Status: SHIPPED | OUTPATIENT
Start: 2019-11-11 | End: 2021-01-19

## 2019-11-11 NOTE — TELEPHONE ENCOUNTER
"Requested Prescriptions   Pending Prescriptions Disp Refills     levothyroxine (SYNTHROID/LEVOTHROID) 100 MCG tablet [Pharmacy Med Name: LEVOTHYROXINE 0.100MG (100MCG) TAB] 90 tablet 0     Sig: TAKE 1 TABLET BY MOUTH EVERY DAY       Thyroid Protocol Passed - 11/8/2019  4:01 AM        Passed - Patient is 12 years or older        Passed - Recent (12 mo) or future (30 days) visit within the authorizing provider's specialty     Patient has had an office visit with the authorizing provider or a provider within the authorizing providers department within the previous 12 mos or has a future within next 30 days. See \"Patient Info\" tab in inbasket, or \"Choose Columns\" in Meds & Orders section of the refill encounter.              Passed - Medication is active on med list        Passed - Normal TSH on file in past 12 months     No lab results found.           lov 01/25/2019  Prescription approved per [facility] Refill Protocol.      "

## 2020-02-19 DIAGNOSIS — E03.8 OTHER SPECIFIED HYPOTHYROIDISM: ICD-10-CM

## 2020-02-19 NOTE — TELEPHONE ENCOUNTER
LEVOTHYROXINE 100 MCG PO tablet          Sig: TAKE 1 TABLET(100 MCG) BY MOUTH DAILY           Last Written Prescription Date:  11/11/19  Last Fill Quantity: 90,   # refills: 0  Last Office Visit: 1/25/19  Future Office visit:       Routing refill request to provider for review/approval because:  Patient due for annual exam and lab. Has been greater then 1 year since either has been done. Unable to refill per protocol. To Andrew Ross for consideration. Isabelle Jurado RN on 2/19/2020 at 2:56 PM

## 2020-02-20 RX ORDER — LEVOTHYROXINE SODIUM 100 UG/1
TABLET ORAL
Qty: 90 TABLET | Refills: 0 | Status: SHIPPED | OUTPATIENT
Start: 2020-02-20 | End: 2020-07-27

## 2020-07-24 DIAGNOSIS — E03.8 OTHER SPECIFIED HYPOTHYROIDISM: ICD-10-CM

## 2020-07-27 RX ORDER — LEVOTHYROXINE SODIUM 100 UG/1
TABLET ORAL
Qty: 90 TABLET | Refills: 0 | Status: SHIPPED | OUTPATIENT
Start: 2020-07-27 | End: 2020-11-18

## 2020-07-27 NOTE — TELEPHONE ENCOUNTER
"Requested Prescriptions   Pending Prescriptions Disp Refills     levothyroxine (SYNTHROID/LEVOTHROID) 100 MCG tablet [Pharmacy Med Name: LEVOTHYROXINE 0.100MG (100MCG) TAB] 90 tablet 0     Sig: TAKE 1 TABLET(100 MCG) BY MOUTH DAILY       Thyroid Protocol Failed - 7/24/2020  3:58 AM        Failed - Recent (12 mo) or future (30 days) visit within the authorizing provider's specialty     Patient has had an office visit with the authorizing provider or a provider within the authorizing providers department within the previous 12 mos or has a future within next 30 days. See \"Patient Info\" tab in inbasket, or \"Choose Columns\" in Meds & Orders section of the refill encounter.              Failed - Normal TSH on file in past 12 months     No lab results found.           Passed - Patient is 12 years or older        Passed - Medication is active on med list               Last Written Prescription Date:  02/20/2020  Last Fill Quantity: 90,   # refills: 0  Last Office Visit: 01/25/2019 with Andrew Ross MD  Future Office visit:   None noted.  Unable to complete prescription refill per RN medication refill policy. Will route to provider for review and consideration.   Sonia Canseco RN on 7/27/2020 at 10:41 AM                "

## 2020-11-16 DIAGNOSIS — E03.8 OTHER SPECIFIED HYPOTHYROIDISM: ICD-10-CM

## 2020-11-17 NOTE — TELEPHONE ENCOUNTER
Bjorn MARIE sent Rx request for the following:   levothyroxine (SYNTHROID/LEVOTHROID) 100 MCG tablet  Sig: TAKE 1 TABLET(100 MCG) BY MOUTH DAILY   Last Prescription Date:   7/27/2020  Last Fill Qty/Refills:         90, R-0    Last Office Visit:              1/25/2019   Future Office visit:           None noted        Called patient regarding need for annual review.Left message.   Second attempt made to contact patient. No answer. Will route to provider at this time.   Bibi Marquez RN ....................  11/17/2020   1:14 PM

## 2020-11-19 RX ORDER — LEVOTHYROXINE SODIUM 100 UG/1
TABLET ORAL
Qty: 30 TABLET | Refills: 0 | Status: SHIPPED | OUTPATIENT
Start: 2020-11-19 | End: 2021-01-19

## 2020-11-19 NOTE — TELEPHONE ENCOUNTER
Dr. Ross is out until 11/23/2020. Routing to  [facility]. Bibi Marquez RN ....................  11/19/2020   10:14 AM

## 2020-12-08 ENCOUNTER — TELEPHONE (OUTPATIENT)
Dept: FAMILY MEDICINE | Facility: OTHER | Age: 45
End: 2020-12-08

## 2020-12-19 DIAGNOSIS — E03.8 OTHER SPECIFIED HYPOTHYROIDISM: ICD-10-CM

## 2020-12-21 RX ORDER — LEVOTHYROXINE SODIUM 100 UG/1
TABLET ORAL
Qty: 30 TABLET | Refills: 0 | OUTPATIENT
Start: 2020-12-21

## 2020-12-21 NOTE — TELEPHONE ENCOUNTER
[facility] sent Rx request for the following:      Requested Prescriptions   Pending Prescriptions Disp Refills   levothyroxine (SYNTHROID/LEVOTHROID) 100 MCG tablet [Pharmacy Med Name: LEVOTHYROXINE 0.100MG (100MCG) TAB] 30 tablet 0    Sig: TAKE 1 TABLET(100 MCG) BY MOUTH DAILY   Last Prescription Date:   11/19/20  Last Fill Qty/Refills:         30, R-0    Last Office Visit:              1/25/19  Future Office visit:           None     Thyroid Protocol Failed - 12/21/2020 10:46 AM       Failed - Recent (12 mo) or future (30 days) visit within the authorizing provider's specialty       Failed - Normal TSH on file in past 12 months     Per telephone encounter, dated 12/8/20, Pt was informed that he would need to be seen before his next refill. No upcoming appointments noted. Refill request refused, with note to pharmacy. Unable to complete prescription refill per RN Medication Refill Policy. Jazlyn Padilla RN .............. 12/21/2020  10:48 AM

## 2021-01-19 ENCOUNTER — OFFICE VISIT (OUTPATIENT)
Dept: FAMILY MEDICINE | Facility: OTHER | Age: 46
End: 2021-01-19
Attending: FAMILY MEDICINE
Payer: COMMERCIAL

## 2021-01-19 VITALS
SYSTOLIC BLOOD PRESSURE: 128 MMHG | HEART RATE: 69 BPM | DIASTOLIC BLOOD PRESSURE: 70 MMHG | WEIGHT: 205.8 LBS | BODY MASS INDEX: 29.96 KG/M2 | TEMPERATURE: 95.5 F | RESPIRATION RATE: 14 BRPM | OXYGEN SATURATION: 95 %

## 2021-01-19 DIAGNOSIS — E03.9 HYPOTHYROIDISM, UNSPECIFIED TYPE: Primary | ICD-10-CM

## 2021-01-19 DIAGNOSIS — E04.2 MULTINODULAR GOITER (NONTOXIC): ICD-10-CM

## 2021-01-19 DIAGNOSIS — E03.8 OTHER SPECIFIED HYPOTHYROIDISM: ICD-10-CM

## 2021-01-19 PROCEDURE — 99213 OFFICE O/P EST LOW 20 MIN: CPT | Performed by: FAMILY MEDICINE

## 2021-01-19 RX ORDER — LEVOTHYROXINE SODIUM 100 UG/1
100 TABLET ORAL DAILY
Qty: 90 TABLET | Refills: 4 | Status: SHIPPED | OUTPATIENT
Start: 2021-01-19 | End: 2021-04-15

## 2021-01-19 ASSESSMENT — ANXIETY QUESTIONNAIRES
GAD7 TOTAL SCORE: 0
3. WORRYING TOO MUCH ABOUT DIFFERENT THINGS: NOT AT ALL
5. BEING SO RESTLESS THAT IT IS HARD TO SIT STILL: NOT AT ALL
IF YOU CHECKED OFF ANY PROBLEMS ON THIS QUESTIONNAIRE, HOW DIFFICULT HAVE THESE PROBLEMS MADE IT FOR YOU TO DO YOUR WORK, TAKE CARE OF THINGS AT HOME, OR GET ALONG WITH OTHER PEOPLE: NOT DIFFICULT AT ALL
6. BECOMING EASILY ANNOYED OR IRRITABLE: NOT AT ALL
2. NOT BEING ABLE TO STOP OR CONTROL WORRYING: NOT AT ALL
1. FEELING NERVOUS, ANXIOUS, OR ON EDGE: NOT AT ALL
7. FEELING AFRAID AS IF SOMETHING AWFUL MIGHT HAPPEN: NOT AT ALL

## 2021-01-19 ASSESSMENT — PATIENT HEALTH QUESTIONNAIRE - PHQ9: 5. POOR APPETITE OR OVEREATING: NOT AT ALL

## 2021-01-19 ASSESSMENT — PAIN SCALES - GENERAL: PAINLEVEL: NO PAIN (0)

## 2021-01-19 NOTE — NURSING NOTE
"Coming in for a medication check up    Chief Complaint   Patient presents with     Recheck Medication     needs TSH checked and refill       Initial /70   Pulse 69   Temp 95.5  F (35.3  C)   Resp 14   Wt 93.4 kg (205 lb 12.8 oz)   SpO2 95%   BMI 29.96 kg/m   Estimated body mass index is 29.96 kg/m  as calculated from the following:    Height as of 1/25/19: 1.765 m (5' 9.5\").    Weight as of this encounter: 93.4 kg (205 lb 12.8 oz).  Medication Reconciliation: complete    Esthela Flores LPN  "

## 2021-01-19 NOTE — PROGRESS NOTES
Assessment & Plan   Problem List Items Addressed This Visit        Endocrine    Hypothyroidism - Primary    Relevant Orders    Thyrotropin GH    Multinodular goiter (nontoxic)    Relevant Orders    US Thyroid         Refilled meds.  Off meds now so cannot fully interpret labs if way off.  Will order labs, but I want him to get them done in 6 or more weeks.                                    No follow-ups on file.    Andrew Ross MD  [facility]    Oralia Stein is a 45 year old who presents to clinic today for the following health issues     HPI   Follow up on thyroid.  Last TSH was normal 1 year ago.  He has been on meds for years.  Had a goiter, 10 years ago.  meds shrunk it.  Has no neck pain, no dysphagia or voice changes.  He stopped meds at one point, goiter returned so resumed meds.  He has been off now for 10 days, ran out.            Review of Systems         Objective    /70   Pulse 69   Temp 95.5  F (35.3  C)   Resp 14   Wt 93.4 kg (205 lb 12.8 oz)   SpO2 95%   BMI 29.96 kg/m    Body mass index is 29.96 kg/m .  Physical Exam  Constitutional:       Appearance: Normal appearance.   Neck:      Musculoskeletal: Normal range of motion. No neck rigidity or muscular tenderness.      Comments: Diffusely enlarged thyroid.  No whit palpalble nodules   Neurological:      General: No focal deficit present.      Mental Status: He is alert and oriented to person, place, and time.   Psychiatric:         Mood and Affect: Mood normal.         Behavior: Behavior normal.         Thought Content: Thought content normal.

## 2021-01-20 ASSESSMENT — ANXIETY QUESTIONNAIRES: GAD7 TOTAL SCORE: 0

## 2021-04-15 DIAGNOSIS — E03.8 OTHER SPECIFIED HYPOTHYROIDISM: ICD-10-CM

## 2021-04-15 RX ORDER — LEVOTHYROXINE SODIUM 100 UG/1
100 TABLET ORAL DAILY
Qty: 90 TABLET | Refills: 4 | Status: SHIPPED | OUTPATIENT
Start: 2021-04-15 | End: 2022-07-01

## 2021-04-29 ENCOUNTER — IMMUNIZATION (OUTPATIENT)
Dept: FAMILY MEDICINE | Facility: OTHER | Age: 46
End: 2021-04-29
Attending: FAMILY MEDICINE
Payer: COMMERCIAL

## 2021-04-29 PROCEDURE — 0001A PR COVID VAC PFIZER DIL RECON 30 MCG/0.3 ML IM: CPT

## 2021-04-29 PROCEDURE — 91300 PR COVID VAC PFIZER DIL RECON 30 MCG/0.3 ML IM: CPT

## 2021-05-20 ENCOUNTER — IMMUNIZATION (OUTPATIENT)
Dept: FAMILY MEDICINE | Facility: OTHER | Age: 46
End: 2021-05-20
Attending: FAMILY MEDICINE
Payer: COMMERCIAL

## 2021-05-20 PROCEDURE — 0002A PR COVID VAC PFIZER DIL RECON 30 MCG/0.3 ML IM: CPT

## 2021-05-20 PROCEDURE — 91300 PR COVID VAC PFIZER DIL RECON 30 MCG/0.3 ML IM: CPT

## 2021-05-26 ENCOUNTER — RESULTS ONLY (OUTPATIENT)
Dept: LAB | Age: 46
End: 2021-05-26

## 2021-05-26 ENCOUNTER — APPOINTMENT (OUTPATIENT)
Dept: FAMILY MEDICINE | Facility: OTHER | Age: 46
End: 2021-05-26
Attending: FAMILY MEDICINE

## 2021-05-26 LAB
ALBUMIN UR-MCNC: NEGATIVE MG/DL
APPEARANCE UR: CLEAR
BILIRUB UR QL STRIP: NEGATIVE
COLOR UR AUTO: YELLOW
GLUCOSE UR STRIP-MCNC: NEGATIVE MG/DL
HGB UR QL STRIP: NEGATIVE
KETONES UR STRIP-MCNC: NEGATIVE MG/DL
LEUKOCYTE ESTERASE UR QL STRIP: NEGATIVE
NITRATE UR QL: NEGATIVE
PH UR STRIP: 5.5 PH (ref 5–7)
SOURCE: NORMAL
SP GR UR STRIP: 1 (ref 1–1.03)
UROBILINOGEN UR STRIP-MCNC: NORMAL MG/DL (ref 0–2)

## 2021-05-26 PROCEDURE — 99499 UNLISTED E&M SERVICE: CPT | Performed by: FAMILY MEDICINE

## 2021-05-28 NOTE — TELEPHONE ENCOUNTER
Called patient back and informed him he would need to be seen before his next refill    Esthela Flores LPN on 12/8/2020 at 1:31 PM     Occupational therapy note: Attempted to see patient for OT treatment. Patient currently receiving LE doppler for DVT rule out at bedside. Will follow up with patient at later time. Milvia Vick MS OTR/L

## 2022-06-30 DIAGNOSIS — E03.8 OTHER SPECIFIED HYPOTHYROIDISM: ICD-10-CM

## 2022-07-01 RX ORDER — LEVOTHYROXINE SODIUM 100 UG/1
TABLET ORAL
Qty: 90 TABLET | Refills: 0 | Status: SHIPPED | OUTPATIENT
Start: 2022-07-01 | End: 2022-09-30

## 2022-07-01 NOTE — TELEPHONE ENCOUNTER
"Requested Prescriptions   Pending Prescriptions Disp Refills     levothyroxine (SYNTHROID/LEVOTHROID) 100 MCG tablet [Pharmacy Med Name: L-THYROXINE (SYNTHROID) TABS 100MCG] 90 tablet 3     Sig: TAKE 1 TABLET DAILY       Thyroid Protocol Failed - 6/30/2022 11:33 PM        Failed - Recent (12 mo) or future (30 days) visit within the authorizing provider's specialty     Patient has had an office visit with the authorizing provider or a provider within the authorizing providers department within the previous 12 mos or has a future within next 30 days. See \"Patient Info\" tab in inbasket, or \"Choose Columns\" in Meds & Orders section of the refill encounter.              Failed - Normal TSH on file in past 12 months     No lab results found.           Passed - Patient is 12 years or older        Passed - Medication is active on med list     Last Written Prescription Date:  4/15/21  Last Fill Quantity: 90,   # refills: 4  Last Office Visit: 1/19/21 [facility]  Future Office visit: none      Routing refill request to provider for review/approval  Patient need lab draw?  Brenda J. Goodell, RN on 7/1/2022 at 2:30 PM      "

## 2022-09-28 DIAGNOSIS — E03.8 OTHER SPECIFIED HYPOTHYROIDISM: ICD-10-CM

## 2022-09-28 NOTE — LETTER
September 30, 2022      Emil Logan  [id number]   [place] [address]        Dear Emil,       This letter is to remind you that you are due for your annual exam with Andrew Ross. Your last comprehensive visit was more than 12 months ago.    Additional refills require you to complete this appointment.    Please call the clinic at [phone] to schedule your appointment.    If you should require additional refills before your scheduled appointment, please contact your pharmacy and we will refill your medication until the date of your appointment.    If you are no longer seeing Andrew Ross for primary care, please call to let us know.       Thank you for choosing [facility] and [facility] for your health care needs.    Sincerely,    Refill LACY  [facility]

## 2022-09-30 RX ORDER — LEVOTHYROXINE SODIUM 100 UG/1
TABLET ORAL
Qty: 90 TABLET | Refills: 0 | Status: SHIPPED | OUTPATIENT
Start: 2022-09-30 | End: 2022-11-09

## 2022-09-30 NOTE — TELEPHONE ENCOUNTER
Routing refill request to provider for review/approval because:    LOV; 1/19/21    Patient due for annual review   Letter sent  Will route to outreach to call patient and help assist in scheduling appointment.    Radha Contreras RN on 9/30/2022 at 3:27 PM

## 2022-10-03 NOTE — TELEPHONE ENCOUNTER
Talked to patient. Scheduled for annual exam 11/9/22 with JEFFERSONP.    Franchesca Molina on 10/3/2022 at 2:32 PM

## 2022-11-09 ENCOUNTER — OFFICE VISIT (OUTPATIENT)
Dept: FAMILY MEDICINE | Facility: OTHER | Age: 47
End: 2022-11-09
Attending: FAMILY MEDICINE
Payer: COMMERCIAL

## 2022-11-09 VITALS
TEMPERATURE: 97.6 F | OXYGEN SATURATION: 97 % | HEIGHT: 68 IN | WEIGHT: 204.2 LBS | RESPIRATION RATE: 15 BRPM | BODY MASS INDEX: 30.95 KG/M2 | HEART RATE: 70 BPM | DIASTOLIC BLOOD PRESSURE: 72 MMHG | SYSTOLIC BLOOD PRESSURE: 122 MMHG

## 2022-11-09 DIAGNOSIS — Z00.00 ROUTINE GENERAL MEDICAL EXAMINATION AT A HEALTH CARE FACILITY: Primary | ICD-10-CM

## 2022-11-09 DIAGNOSIS — Z13.220 LIPID SCREENING: ICD-10-CM

## 2022-11-09 DIAGNOSIS — E03.8 OTHER SPECIFIED HYPOTHYROIDISM: ICD-10-CM

## 2022-11-09 DIAGNOSIS — Z12.11 COLON CANCER SCREENING: ICD-10-CM

## 2022-11-09 DIAGNOSIS — Z12.11 SCREEN FOR COLON CANCER: ICD-10-CM

## 2022-11-09 DIAGNOSIS — Z11.59 NEED FOR HEPATITIS C SCREENING TEST: ICD-10-CM

## 2022-11-09 DIAGNOSIS — E04.2 MULTINODULAR GOITER (NONTOXIC): ICD-10-CM

## 2022-11-09 LAB
CHOLEST SERPL-MCNC: 170 MG/DL
HDLC SERPL-MCNC: 75 MG/DL
LDLC SERPL CALC-MCNC: 71 MG/DL
NONHDLC SERPL-MCNC: 95 MG/DL
TRIGL SERPL-MCNC: 121 MG/DL
TSH SERPL DL<=0.005 MIU/L-ACNC: 0.34 UIU/ML (ref 0.3–4.2)

## 2022-11-09 PROCEDURE — 84443 ASSAY THYROID STIM HORMONE: CPT | Mod: ZL | Performed by: FAMILY MEDICINE

## 2022-11-09 PROCEDURE — 36415 COLL VENOUS BLD VENIPUNCTURE: CPT | Mod: ZL | Performed by: FAMILY MEDICINE

## 2022-11-09 PROCEDURE — 80061 LIPID PANEL: CPT | Mod: ZL | Performed by: FAMILY MEDICINE

## 2022-11-09 PROCEDURE — 90471 IMMUNIZATION ADMIN: CPT | Performed by: FAMILY MEDICINE

## 2022-11-09 PROCEDURE — 91312 COVID-19,PF,PFIZER BOOSTER BIVALENT: CPT | Performed by: FAMILY MEDICINE

## 2022-11-09 PROCEDURE — 90715 TDAP VACCINE 7 YRS/> IM: CPT | Performed by: FAMILY MEDICINE

## 2022-11-09 PROCEDURE — 0124A COVID-19,PF,PFIZER BOOSTER BIVALENT: CPT | Performed by: FAMILY MEDICINE

## 2022-11-09 PROCEDURE — 99396 PREV VISIT EST AGE 40-64: CPT | Mod: 25 | Performed by: FAMILY MEDICINE

## 2022-11-09 PROCEDURE — 86803 HEPATITIS C AB TEST: CPT | Mod: ZL | Performed by: FAMILY MEDICINE

## 2022-11-09 RX ORDER — LEVOTHYROXINE SODIUM 100 UG/1
100 TABLET ORAL DAILY
Qty: 90 TABLET | Refills: 4 | Status: SHIPPED | OUTPATIENT
Start: 2022-11-09 | End: 2023-12-05

## 2022-11-09 ASSESSMENT — ENCOUNTER SYMPTOMS
NAUSEA: 0
JOINT SWELLING: 0
HEARTBURN: 0
ARTHRALGIAS: 0
HEMATOCHEZIA: 0
SHORTNESS OF BREATH: 0
DIZZINESS: 0
WEAKNESS: 0
MYALGIAS: 0
DIARRHEA: 0
EYE PAIN: 0
HEADACHES: 0
ABDOMINAL PAIN: 0
HEMATURIA: 0
PALPITATIONS: 0
CHILLS: 0
CONSTIPATION: 0
DYSURIA: 0
COUGH: 0
FEVER: 0
FREQUENCY: 0
PARESTHESIAS: 0
NERVOUS/ANXIOUS: 0
SORE THROAT: 0

## 2022-11-09 ASSESSMENT — ANXIETY QUESTIONNAIRES
1. FEELING NERVOUS, ANXIOUS, OR ON EDGE: NOT AT ALL
5. BEING SO RESTLESS THAT IT IS HARD TO SIT STILL: NOT AT ALL
6. BECOMING EASILY ANNOYED OR IRRITABLE: NOT AT ALL
7. FEELING AFRAID AS IF SOMETHING AWFUL MIGHT HAPPEN: NOT AT ALL
3. WORRYING TOO MUCH ABOUT DIFFERENT THINGS: NOT AT ALL
IF YOU CHECKED OFF ANY PROBLEMS ON THIS QUESTIONNAIRE, HOW DIFFICULT HAVE THESE PROBLEMS MADE IT FOR YOU TO DO YOUR WORK, TAKE CARE OF THINGS AT HOME, OR GET ALONG WITH OTHER PEOPLE: NOT DIFFICULT AT ALL
4. TROUBLE RELAXING: NOT AT ALL
GAD7 TOTAL SCORE: 0
7. FEELING AFRAID AS IF SOMETHING AWFUL MIGHT HAPPEN: NOT AT ALL
8. IF YOU CHECKED OFF ANY PROBLEMS, HOW DIFFICULT HAVE THESE MADE IT FOR YOU TO DO YOUR WORK, TAKE CARE OF THINGS AT HOME, OR GET ALONG WITH OTHER PEOPLE?: NOT DIFFICULT AT ALL
2. NOT BEING ABLE TO STOP OR CONTROL WORRYING: NOT AT ALL
GAD7 TOTAL SCORE: 0

## 2022-11-09 NOTE — PROGRESS NOTES
SUBJECTIVE:   CC: Emil is an 47 year old who presents for preventative health visit.       Patient has been advised of split billing requirements and indicates understanding: Yes  Healthy Habits:     Getting at least 3 servings of Calcium per day:  Yes    Bi-annual eye exam:  Yes    Dental care twice a year:  NO    Sleep apnea or symptoms of sleep apnea:  None    Diet:  Regular (no restrictions)    Frequency of exercise:  2-3 days/week    Duration of exercise:  15-30 minutes    Taking medications regularly:  Yes    Medication side effects:  Not applicable    PHQ-2 Total Score: 0    Additional concerns today:  No              Today's PHQ-2 Score:   PHQ-2 ( 1999 [employer]) 11/9/2022   Q1: Little interest or pleasure in doing things 0   Q2: Feeling down, depressed or hopeless 0   PHQ-2 Score 0   PHQ-2 Total Score (12-17 Years)- Positive if 3 or more points; Administer PHQ-A if positive -   Q1: Little interest or pleasure in doing things Not at all   Q2: Feeling down, depressed or hopeless Not at all   PHQ-2 Score 0       Abuse: Current or Past(Physical, Sexual or Emotional)- No  Do you feel safe in your environment? Yes    Have you ever done Advance Care Planning? (For example, a Health Directive, POLST, or a discussion with a medical provider or your loved ones about your wishes): No, advance care planning information given to patient to review.  Patient declined advance care planning discussion at this time.    Social History     Tobacco Use     Smoking status: Former     Packs/day: 0.25     Types: Cigarettes     Quit date: 2012     Years since quitting: 10.8     Smokeless tobacco: Former     Types: Chew   Substance Use Topics     Alcohol use: Yes     Alcohol/week: 0.0 standard drinks     Comment: weekly     If you drink alcohol do you typically have >3 drinks per day or >7 drinks per week? No    Alcohol Use 11/9/2022   Prescreen: >3 drinks/day or >7 drinks/week? No       Last PSA: No results found for:  "PSA    Reviewed orders with patient. Reviewed health maintenance and updated orders accordingly - Yes  Labs reviewed in EPIC  Current Outpatient Medications   Medication Sig Dispense Refill     levothyroxine (SYNTHROID/LEVOTHROID) 100 MCG tablet Take 1 tablet (100 mcg) by mouth daily 90 tablet 4     Allergies   Allergen Reactions     Fish-Derived Products Other (See Comments)     Angioedema, fresh water fish       Reviewed and updated as needed this visit by clinical staff   Tobacco  Allergies  Meds   Med Hx  Surg Hx  Fam Hx  Soc Hx        Reviewed and updated as needed this visit by Provider                 Past Medical History:   Diagnosis Date     Hypothyroidism     No Comments Provided      Past Surgical History:   Procedure Laterality Date     OTHER SURGICAL HISTORY      [id number],NO PREVIOUS SURGERY       Review of Systems   Constitutional: Negative for chills and fever.   HENT: Negative for congestion, ear pain, hearing loss and sore throat.    Eyes: Negative for pain and visual disturbance.   Respiratory: Negative for cough and shortness of breath.    Cardiovascular: Negative for chest pain, palpitations and peripheral edema.   Gastrointestinal: Negative for abdominal pain, constipation, diarrhea, heartburn, hematochezia and nausea.   Genitourinary: Negative for dysuria, frequency, genital sores, hematuria, impotence, penile discharge and urgency.   Musculoskeletal: Negative for arthralgias, joint swelling and myalgias.   Skin: Negative for rash.   Neurological: Negative for dizziness, weakness, headaches and paresthesias.   Psychiatric/Behavioral: Negative for mood changes. The patient is not nervous/anxious.          OBJECTIVE:   /72   Pulse 70   Temp 97.6  F (36.4  C) (Tympanic)   Resp 15   Ht 1.734 m (5' 8.25\")   Wt 92.6 kg (204 lb 3.2 oz)   SpO2 97%   BMI 30.82 kg/m      Physical Exam  GENERAL: healthy, alert and no distress  EYES: Eyes grossly normal to inspection, PERRL and " conjunctivae and sclerae normal  HENT: ear canals and TM's normal, nose and mouth without ulcers or lesions  NECK: no adenopathy, no asymmetry, masses, or scars and thyroid normal to palpation  RESP: lungs clear to auscultation - no rales, rhonchi or wheezes  CV: regular rate and rhythm, normal S1 S2, no S3 or S4, no murmur, click or rub, no peripheral edema and peripheral pulses strong  ABDOMEN: soft, nontender, no hepatosplenomegaly, no masses and bowel sounds normal  MS: no gross musculoskeletal defects noted, no edema  SKIN: no suspicious lesions or rashes  NEURO: Normal strength and tone, mentation intact and speech normal  PSYCH: mentation appears normal, affect normal/bright    Diagnostic Test Results:  Labs reviewed in Epic  Results for orders placed or performed in visit on 11/09/22   Lipid Panel     Status: Normal   Result Value Ref Range    Cholesterol 170 <200 mg/dL    Triglycerides 121 <150 mg/dL    Direct Measure HDL 75 >=40 mg/dL    LDL Cholesterol Calculated 71 <=100 mg/dL    Non HDL Cholesterol 95 <130 mg/dL    Narrative    Cholesterol  Desirable:  <200 mg/dL    Triglycerides  Normal:  Less than 150 mg/dL  Borderline High:  150-199 mg/dL  High:  200-499 mg/dL  Very High:  Greater than or equal to 500 mg/dL    Direct Measure HDL  Female:  Greater than or equal to 50 mg/dL   Male:  Greater than or equal to 40 mg/dL    LDL Cholesterol  Desirable:  <100mg/dL  Above Desirable:  100-129 mg/dL   Borderline High:  130-159 mg/dL   High:  160-189 mg/dL   Very High:  >= 190 mg/dL    Non HDL Cholesterol  Desirable:  130 mg/dL  Above Desirable:  130-159 mg/dL  Borderline High:  160-189 mg/dL  High:  190-219 mg/dL  Very High:  Greater than or equal to 220 mg/dL   TSH     Status: Normal   Result Value Ref Range    TSH 0.34 0.30 - 4.20 uIU/mL         ASSESSMENT/PLAN:       ICD-10-CM    1. Routine general medical examination at a health care facility  Z00.00       2. Screen for colon cancer  Z12.11       3. Need for  "hepatitis C screening test  Z11.59 Hepatitis C Screen Reflex to HCV RNA Quant and Genotype      4. Multinodular goiter (nontoxic)  E04.2       5. Other specified hypothyroidism  E03.8 levothyroxine (SYNTHROID/LEVOTHROID) 100 MCG tablet     TSH      6. Lipid screening  Z13.220 Lipid Panel      7. Colon cancer screening  Z12.11 SURESH([employer])                COUNSELING:   Reviewed preventive health counseling, as reflected in patient instructions       Regular exercise       Healthy diet/nutrition       Colorectal cancer screening    Estimated body mass index is 30.82 kg/m  as calculated from the following:    Height as of this encounter: 1.734 m (5' 8.25\").    Weight as of this encounter: 92.6 kg (204 lb 3.2 oz).     Weight management plan: Discussed healthy diet and exercise guidelines    He reports that he quit smoking about 10 years ago. His smoking use included cigarettes. He smoked an average of .25 packs per day. He has quit using smokeless tobacco.  His smokeless tobacco use included chew.      Counseling Resources:  ATP IV Guidelines  Pooled Cohorts Equation Calculator  FRAX Risk Assessment  ICSI Preventive Guidelines  Dietary Guidelines for Americans, 2010  USDA's MyPlate  ASA Prophylaxis  Lung CA Screening    Andrew Ross MD  [facility]  Answers for HPI/ROS submitted by the patient on 11/9/2022  RAD 7 TOTAL SCORE: 0      "

## 2022-11-09 NOTE — LETTER
November 10, 2022      Emil Logan  [address]   [place] [address]        Dear ,    We are writing to inform you of your test results.    Your test results fall within the expected range(s) or remain unchanged from previous results.  Please continue with current treatment plan.    Resulted Orders   Lipid Panel   Result Value Ref Range    Cholesterol 170 <200 mg/dL    Triglycerides 121 <150 mg/dL    Direct Measure HDL 75 >=40 mg/dL    LDL Cholesterol Calculated 71 <=100 mg/dL    Non HDL Cholesterol 95 <130 mg/dL    Narrative    Cholesterol  Desirable:  <200 mg/dL    Triglycerides  Normal:  Less than 150 mg/dL  Borderline High:  150-199 mg/dL  High:  200-499 mg/dL  Very High:  Greater than or equal to 500 mg/dL    Direct Measure HDL  Female:  Greater than or equal to 50 mg/dL   Male:  Greater than or equal to 40 mg/dL    LDL Cholesterol  Desirable:  <100mg/dL  Above Desirable:  100-129 mg/dL   Borderline High:  130-159 mg/dL   High:  160-189 mg/dL   Very High:  >= 190 mg/dL    Non HDL Cholesterol  Desirable:  130 mg/dL  Above Desirable:  130-159 mg/dL  Borderline High:  160-189 mg/dL  High:  190-219 mg/dL  Very High:  Greater than or equal to 220 mg/dL   TSH   Result Value Ref Range    TSH 0.34 0.30 - 4.20 uIU/mL   Hepatitis C Screen Reflex to HCV RNA Quant and Genotype   Result Value Ref Range    Hepatitis C Antibody Nonreactive Nonreactive    Narrative    Assay performance characteristics have not been established for newborns, infants, and children.       If you have any questions or concerns, please call the clinic at the number listed above.       Sincerely,      Andrew Ross MD

## 2022-11-09 NOTE — LETTER
November 10, 2022      Emil Logan  [address] [address]  [place] [address]        Dear ,    We are writing to inform you of your test results.    Your test results fall within the expected range(s) or remain unchanged from previous results.  Please continue with current treatment plan.    Resulted Orders   Lipid Panel   Result Value Ref Range    Cholesterol 170 <200 mg/dL    Triglycerides 121 <150 mg/dL    Direct Measure HDL 75 >=40 mg/dL    LDL Cholesterol Calculated 71 <=100 mg/dL    Non HDL Cholesterol 95 <130 mg/dL    Narrative    Cholesterol  Desirable:  <200 mg/dL    Triglycerides  Normal:  Less than 150 mg/dL  Borderline High:  150-199 mg/dL  High:  200-499 mg/dL  Very High:  Greater than or equal to 500 mg/dL    Direct Measure HDL  Female:  Greater than or equal to 50 mg/dL   Male:  Greater than or equal to 40 mg/dL    LDL Cholesterol  Desirable:  <100mg/dL  Above Desirable:  100-129 mg/dL   Borderline High:  130-159 mg/dL   High:  160-189 mg/dL   Very High:  >= 190 mg/dL    Non HDL Cholesterol  Desirable:  130 mg/dL  Above Desirable:  130-159 mg/dL  Borderline High:  160-189 mg/dL  High:  190-219 mg/dL  Very High:  Greater than or equal to 220 mg/dL   TSH   Result Value Ref Range    TSH 0.34 0.30 - 4.20 uIU/mL       If you have any questions or concerns, please call the clinic at the number listed above.       Sincerely,      Andrew Ross MD

## 2022-11-09 NOTE — NURSING NOTE
"Chief Complaint   Patient presents with     Physical       Initial /72   Pulse 70   Temp 97.6  F (36.4  C) (Tympanic)   Resp 15   Ht 1.734 m (5' 8.25\")   Wt 92.6 kg (204 lb 3.2 oz)   SpO2 97%   BMI 30.82 kg/m   Estimated body mass index is 30.82 kg/m  as calculated from the following:    Height as of this encounter: 1.734 m (5' 8.25\").    Weight as of this encounter: 92.6 kg (204 lb 3.2 oz).  Medication Reconciliation: complete    FOOD SECURITY SCREENING QUESTIONS  Hunger Vital Signs:  Within the past 12 months we worried whether our food would run out before we got money to buy more. Never  Within the past 12 months the food we bought just didn't last and we didn't have money to get more. Never  Jennifer Goodman LPN 11/9/2022 8:05 AM      "

## 2022-11-10 LAB — HCV AB SERPL QL IA: NONREACTIVE

## 2023-05-03 ENCOUNTER — APPOINTMENT (OUTPATIENT)
Dept: FAMILY MEDICINE | Facility: OTHER | Age: 48
End: 2023-05-03
Attending: FAMILY MEDICINE

## 2023-05-03 PROCEDURE — 99499 UNLISTED E&M SERVICE: CPT | Performed by: FAMILY MEDICINE

## 2023-05-03 PROCEDURE — 999N001196 HC STATISTIC UA W/ REFLEX TO MICRO GICH: Mod: ZL | Performed by: FAMILY MEDICINE

## 2023-11-30 DIAGNOSIS — E03.8 OTHER SPECIFIED HYPOTHYROIDISM: ICD-10-CM

## 2023-12-05 RX ORDER — LEVOTHYROXINE SODIUM 100 UG/1
100 TABLET ORAL DAILY
Qty: 90 TABLET | Refills: 3 | Status: SHIPPED | OUTPATIENT
Start: 2023-12-05

## 2024-12-06 ENCOUNTER — TELEPHONE (OUTPATIENT)
Dept: FAMILY MEDICINE | Facility: OTHER | Age: 49
End: 2024-12-06
Payer: COMMERCIAL

## 2024-12-06 DIAGNOSIS — E03.8 OTHER SPECIFIED HYPOTHYROIDISM: Primary | ICD-10-CM

## 2024-12-06 NOTE — TELEPHONE ENCOUNTER
Pt is wanting a prescription for levothyroxine [facility] M[facility] would like the prescription to be for 90 days and have 3 refills on it

## 2024-12-09 RX ORDER — LEVOTHYROXINE SODIUM 100 UG/1
100 TABLET ORAL DAILY
Qty: 90 TABLET | Refills: 4 | Status: SHIPPED | OUTPATIENT
Start: 2024-12-09

## 2024-12-09 NOTE — TELEPHONE ENCOUNTER
Called and left a message for a return call to unit 3. Patient has not been seen since 11/09/2022 and that was his last lab as well. Jackeline Shaikh LPN .......................12/9/2024  10:44 AM

## 2024-12-09 NOTE — TELEPHONE ENCOUNTER
Called and notified wife that the prescription was sent and patient should schedule appt soon. Jackeline Shaikh LPN .......................12/9/2024  4:53 PM

## 2025-04-03 ENCOUNTER — APPOINTMENT (OUTPATIENT)
Dept: CHIROPRACTIC MEDICINE | Facility: OTHER | Age: 50
End: 2025-04-03

## 2025-04-03 ENCOUNTER — APPOINTMENT (OUTPATIENT)
Dept: OCCUPATIONAL MEDICINE | Facility: OTHER | Age: 50
End: 2025-04-03

## 2025-04-03 PROCEDURE — 99499 UNLISTED E&M SERVICE: CPT
